# Patient Record
Sex: MALE | Race: WHITE
[De-identification: names, ages, dates, MRNs, and addresses within clinical notes are randomized per-mention and may not be internally consistent; named-entity substitution may affect disease eponyms.]

---

## 2018-11-01 ENCOUNTER — HOSPITAL ENCOUNTER (EMERGENCY)
Dept: HOSPITAL 59 - ER | Age: 23
Discharge: HOME | End: 2018-11-01
Payer: MEDICAID

## 2018-11-01 DIAGNOSIS — F17.210: ICD-10-CM

## 2018-11-01 DIAGNOSIS — L02.11: Primary | ICD-10-CM

## 2018-11-01 PROCEDURE — 99282 EMERGENCY DEPT VISIT SF MDM: CPT

## 2018-11-01 NOTE — EMERGENCY DEPARTMENT RECORD
History of Present Illness





- General


Chief Complaint: Recheck - Other


Stated Complaint: RE-CHECK FACE


Time Seen by Provider: 11/01/18 20:20


Source: Patient


Mode of arrival: Ambulatory


Limitations: No limitations





- History of Present Illness


Initial Comments: 


24 yo male presents for a wound check and packing removal of a right sided neck 

abscess.  He is taking his antibiotics.  He reports significant improvement.  

No pain or swelling.  No fevers.  No redness.  The drainage has stopped.  The 

wound culture was reviewed.  30-40 EBCs, no organisms, in progress.  Rare coag 

- claire LANDIS Complaint: Wound re-check, Other (Packing removal)


Onset/Timing: 3


-: Days(s)


Initial Visit For: Abscess


Returns Today for: Wound recheck


Symptoms Since Prior Visit: Improved


Associated Symptoms: None





- Related Data


 Previous Rx's











 Medication  Instructions  Recorded


 


Cephalexin [Keflex] 500 mg PO QID #28 cap 10/29/18


 


Sulfamethoxazole/Trimethoprim 1 each PO BID #14 tablet 10/29/18





[Bactrim Ds Tablet]  











 Allergies











Allergy/AdvReac Type Severity Reaction Status Date / Time


 


Penicillins Allergy  HIVES Verified 10/29/18 20:35














Travel Screening





- Travel/Exposure Within Last 30 Days


Have you traveled within the last 30 days?: No





Review of Systems


Constitutional: Denies: Chills, Fever, Weakness


Eyes: Denies: Eye discharge


ENT: Denies: Congestion, Throat pain


Respiratory: Denies: Cough


Cardiovascular: Denies: Chest pain, Syncope


Endocrine: Denies: Fatigue


Gastrointestinal: Denies: Abdominal pain, Diarrhea, Nausea, Vomiting


Musculoskeletal: Denies: Neck pain


Skin: Denies: Change in color, Rash


Neurological: Denies: Headache


Psychiatric: Denies: Anxiety


Hematological/Lymphatic: Denies: Easy bleeding, Easy bruising





Past Medical History





- SOCIAL HISTORY


Smoking Status: Current every day smoker


Alcohol Use: None


Drug Use: None





- RESPIRATORY


Hx Respiratory Disorders: No





- CARDIOVASCULAR


Hx Cardio Disorders: No





- NEURO


Hx Neuro Disorders: No





- GI


Hx GI Disorders: No





- 


Hx Genitourinary Disorders: No





- ENDOCRINE


Hx Endocrine Disorders: No





- MUSCULOSKELETAL


Hx Musculoskeletal Disorders: No


Comment:: Abscesses





- PSYCH


Hx Psych Problems: Yes


Comment:: ADHD; Bipolar





- HEMATOLOGY/ONCOLOGY


Hx Hematology/Oncology Disorders: No





Family Medical History


Any Significant Family History?: Yes


Hx HTN: Mother, Grandparents





Physical Exam





- General


General Appearance: Alert, Oriented x3, Cooperative, No acute distress


Limitations: No limitations





- Head


Head exam: Atraumatic, Normal inspection





- Eye


Eye exam: Normal appearance.  negative: Conjunctival injection





- ENT


ENT exam: Normal exam


Ear exam: Normal external inspection


Nasal Exam: Normal inspection


Mouth exam: Normal external inspection





- Neck


Neck exam: Full ROM, Other (Greatly improved abscess site, no swelling, abscess 

resolved, no bleeding or pus, no cellulitis.  Non tender).  negative: Normal 

inspection, Tenderness





- Neurological


Neurological exam: Alert, Oriented X3





- Psychiatric


Psychiatric exam: Normal affect, Normal mood





- Skin


Skin exam: Dry, Intact, Normal color, Warm





Course





 Vital Signs











  11/01/18





  20:22


 


Temperature 98.5 F


 


Pulse Rate [ 96 H





Pulse Ox Probe] 


 


Respiratory 20





Rate 


 


Blood Pressure 124/79





[Right Arm] 


 


Pulse Ox 99














- Reevaluation(s)


Reevaluation #1: 


The dried crusted blood and pus were cleaned off


The wound is very clean.  No warmth, swelling or cellulitis


The packing was removed


He will do dressing changes daily and return as needed


He will continue his antibiotics as directed


11/01/18 20:32








Disposition


Disposition: Discharge


Clinical Impression: 


 Abscess, neck





Disposition: Home, Self-Care


Condition: (1) Good


Instructions:  Abscess Follow-up (ED)


Additional Instructions: 


Change the bandage dressing once daily


You may take a shower and clean with mild soap and water


Keep covered after cleaning


Take the antibiotics as directed until gone 


Time of Disposition: 20:34





Quality





- Quality Measures


Quality Measures: N/A





- Blood Pressure Screening


Does Patient Have Any of the Following: No


Blood Pressure Classification: Pre-Hypertensive BP Reading


Systolic Measurement: 124


Diastolic Measurement: 79


Screening for High Blood Pressure: < Pre-Hypertensive BP, F/U Documented > [

]


Pre-Hypertensive Follow-up Interventions: Referral to alternative/primary care 

provider.